# Patient Record
(demographics unavailable — no encounter records)

---

## 2025-05-03 NOTE — ASSESSMENT
[FreeTextEntry1] : Dr. Teresa reviewed with IMELDA his chest CT scan dated 4/3/2025 in office today. The study shows stable 5 mm right lower lobe subpleural nodule.   Dr. Teresa's recommendation: - Repeat chest CT scan in 11 months for follow up of lung nodule. - Return for pulmonary follow up in 1 year with review of CT results.

## 2025-05-03 NOTE — PROCEDURE
[FreeTextEntry1] : CT CHEST w/o contrast dated 4/3/2025  IMPRESSION:  Stable 5 mm right lower lobe subpleural nodule.

## 2025-05-03 NOTE — SIGNATURES
[TextEntry] : This note was written by Arielle Arroyo on 05/01/2025 acting as medical scribe for Dr. Rosalba Teresa. I, Dr. Rosalba Teresa, have read and attest that all the information, medical decision making and discharge instructions within are true and accurate.

## 2025-05-03 NOTE — DISCUSSION/SUMMARY
[FreeTextEntry1] : Mr. IMELDA NELSON is an 58 year old male, history of lung nodule. Stable lung nodule indicated in recent chest CT scan.